# Patient Record
Sex: FEMALE | Race: BLACK OR AFRICAN AMERICAN | URBAN - METROPOLITAN AREA
[De-identification: names, ages, dates, MRNs, and addresses within clinical notes are randomized per-mention and may not be internally consistent; named-entity substitution may affect disease eponyms.]

---
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
## 2022-08-10 ENCOUNTER — OFFICE VISIT (OUTPATIENT)
Dept: PRIMARY CARE CLINIC | Age: 22
End: 2022-08-10
Payer: MEDICARE

## 2022-08-10 VITALS
DIASTOLIC BLOOD PRESSURE: 76 MMHG | WEIGHT: 99.5 LBS | HEART RATE: 91 BPM | OXYGEN SATURATION: 99 % | SYSTOLIC BLOOD PRESSURE: 110 MMHG | HEIGHT: 65 IN | BODY MASS INDEX: 16.58 KG/M2

## 2022-08-10 DIAGNOSIS — Z11.3 ROUTINE SCREENING FOR STI (SEXUALLY TRANSMITTED INFECTION): ICD-10-CM

## 2022-08-10 DIAGNOSIS — Z01.419 ENCOUNTER FOR ROUTINE GYNECOLOGICAL EXAMINATION WITH PAPANICOLAOU SMEAR OF CERVIX: ICD-10-CM

## 2022-08-10 DIAGNOSIS — Z12.4 PAP SMEAR FOR CERVICAL CANCER SCREENING: Primary | ICD-10-CM

## 2022-08-10 DIAGNOSIS — A56.8 CHLAMYDIA TRACHOMATIS INFECTION: ICD-10-CM

## 2022-08-10 DIAGNOSIS — Z30.09 ENCOUNTER FOR CONTRACEPTIVE PLANNING: ICD-10-CM

## 2022-08-10 PROCEDURE — 99395 PREV VISIT EST AGE 18-39: CPT | Performed by: NURSE PRACTITIONER

## 2022-08-10 ASSESSMENT — ANXIETY QUESTIONNAIRES
2. NOT BEING ABLE TO STOP OR CONTROL WORRYING: 0
IF YOU CHECKED OFF ANY PROBLEMS ON THIS QUESTIONNAIRE, HOW DIFFICULT HAVE THESE PROBLEMS MADE IT FOR YOU TO DO YOUR WORK, TAKE CARE OF THINGS AT HOME, OR GET ALONG WITH OTHER PEOPLE: NOT DIFFICULT AT ALL
4. TROUBLE RELAXING: 0
1. FEELING NERVOUS, ANXIOUS, OR ON EDGE: 0
5. BEING SO RESTLESS THAT IT IS HARD TO SIT STILL: 0
6. BECOMING EASILY ANNOYED OR IRRITABLE: 0
GAD7 TOTAL SCORE: 0
3. WORRYING TOO MUCH ABOUT DIFFERENT THINGS: 0
7. FEELING AFRAID AS IF SOMETHING AWFUL MIGHT HAPPEN: 0

## 2022-08-10 ASSESSMENT — ENCOUNTER SYMPTOMS
SHORTNESS OF BREATH: 0
CHEST TIGHTNESS: 0

## 2022-08-10 ASSESSMENT — PATIENT HEALTH QUESTIONNAIRE - PHQ9
SUM OF ALL RESPONSES TO PHQ9 QUESTIONS 1 & 2: 0
1. LITTLE INTEREST OR PLEASURE IN DOING THINGS: 0
SUM OF ALL RESPONSES TO PHQ QUESTIONS 1-9: 0
SUM OF ALL RESPONSES TO PHQ QUESTIONS 1-9: 0
2. FEELING DOWN, DEPRESSED OR HOPELESS: 0
SUM OF ALL RESPONSES TO PHQ QUESTIONS 1-9: 0
SUM OF ALL RESPONSES TO PHQ QUESTIONS 1-9: 0

## 2022-08-10 NOTE — PROGRESS NOTES
Markell Live  YOB: 2000       Chief Complaint:   Markell Live is a 25 y.o. female who presents for routine gynecologic exam/PAP. HPI  Patient reports menstrual period is regularly, occurring every 28 days or so. She denies change in menstrual cycle, dysmenorrhea, dyspareunia, genital discharge, genital ulcers, irregular/heavy menses, pelvic pain, and vulvar/vaginal symptoms. Sexual activity: single partner, contraception - condoms. We discussed pros and cons to both oral birth control and IUD. Patient admits concern for taking oral birth control daily and interested in IUD today. IUD information provided today. Patient requests referral to OB/GYN Dr. David Asencio, located in [place].     - Last PAP: Never  - LMP:  due any day- Patient to start tracking with phone fernando  - Self-breast exams: no- Information/instruction provided. - Exercise: no regular exercise, but stays active. Plans to tryout for dance team at [employer] this fall. 8/10/2022  No Known Allergies  No current outpatient medications on file. No current facility-administered medications for this visit.      Vitals:    08/10/22 0958   BP: 110/76   Site: Left Upper Arm   Position: Sitting   Cuff Size: Small Adult   Pulse: 91   SpO2: 99%   Weight: 99 lb 8 oz (45.1 kg)   Height: 5' 4.8\" (1.646 m)     [unfilled]  BP Readings from Last 3 Encounters:   08/10/22 110/76   09/19/19 108/70 (42 %, Z = -0.20 /  69 %, Z = 0.50)*   10/01/18 91/64 (3 %, Z = -1.88 /  44 %, Z = -0.15)*     *BP percentiles are based on the 2017 AAP Clinical Practice Guideline for girls         Immunization History   Administered Date(s) Administered    DTaP (Infanrix) 01/13/2003, 03/13/2003, 06/16/2003, 08/03/2004, 11/14/2006, 07/30/2008    DTaP/Hep B/IPV (Pediarix) 08/03/2004, 11/14/2006    HIB PRP-T (ActHIB, Hiberix) 08/17/2004    HPV Quadrivalent (Gardasil) 12/01/2014, 07/27/2015    Hepatitis A Ped/Adol (Havrix, Vaqta) 07/30/2008, 08/13/2009 Hepatitis B Ped/Adol (Engerix-B, Recombivax HB) 2000, 2000, 06/16/2003    Hib PRP-OMP (PedvaxHIB) 01/27/2003, 03/20/2003, 06/16/2003    MMR 02/09/2004, 07/30/2008    Meningococcal MCV4P (Menactra) 12/01/2014, 09/19/2019    Pneumococcal Conjugate 7-valent (Celi Camara) 01/27/2003, 03/20/2003, 04/11/2005    Polio IPV (IPOL) 01/13/2003, 03/13/2003, 08/03/2004, 11/14/2006, 07/30/2008    Tdap (Boostrix, Adacel) 12/01/2014    Varicella (Varivax) 07/30/2008, 08/13/2009         There is no problem list on file for this patient. No past medical history on file. No past surgical history on file. No family history on file. Social History     Socioeconomic History    Marital status: Single     Spouse name: Not on file    Number of children: Not on file    Years of education: Not on file    Highest education level: Not on file   Occupational History    Not on file   Tobacco Use    Smoking status: Never    Smokeless tobacco: Never   Substance and Sexual Activity    Alcohol use: No    Drug use: No    Sexual activity: Never   Other Topics Concern    Not on file   Social History Narrative    Not on file     Social Determinants of Health     Financial Resource Strain: Not on file   Food Insecurity: Not on file   Transportation Needs: Not on file   Physical Activity: Not on file   Stress: Not on file   Social Connections: Not on file   Intimate Partner Violence: Not on file   Housing Stability: Not on file     Review of Systems   Constitutional:  Negative for diaphoresis and fatigue. Respiratory:  Negative for chest tightness and shortness of breath. Cardiovascular:  Negative for chest pain, palpitations and leg swelling. Genitourinary:  Negative for decreased urine volume, difficulty urinating, dyspareunia, dysuria, flank pain, frequency, genital sores, hematuria, menstrual problem, pelvic pain, urgency, vaginal bleeding, vaginal discharge and vaginal pain. Musculoskeletal: Negative. Skin: Negative. Neurological:  Negative for dizziness, light-headedness and headaches. Physical Exam   Constitutional: She is oriented to person, place, and time. She appears well-developed and well-nourished. No distress. Neck: No thyromegaly or thyroid nodules noted. Cardiovascular: Normal rate, regular rhythm, normal heart sounds and intact distal pulses. Exam reveals no gallop and no friction rub. No murmur heard. Pulmonary/Chest: Effort normal and breath sounds normal. No respiratory distress. She has no wheezes, rhonchi or rales. Abdominal: Soft, non-tender. Normal bowel sounds and aorta. She exhibits no organomegaly or mass. Genitourinary: Vagina normal and uterus normal. There are no labial lesions. Cervix exhibits no motion tenderness, no discharge and no friability. There is no adnexal mass, tenderness or fullness. There is no inguinal adenopathy. Rectum normal, guiac negative. No hemorrhoids or anal fissures noted. No breast tenderness, skin changes, nipple discharge or dominant mass bilaterally. No axillary adenopathy. Neurological: She is alert and oriented to person, place, and time. Skin: Skin is warm and dry. There is no rash or erythema. No suspicious lesions noted. Psychiatric: She has a normal mood and affect. Her speech is normal and behavior is normal. Judgment, cognition and memory are normal.     Assessment/Plan:    1. Encounter for routine gynecological examination with Papanicolaou smear of cervix  - Healthy 25year old physical exam    2. Pap smear for cervical cancer screening  - PAP SMEAR    3. Routine screening for STI (sexually transmitted infection  - C.trachomatis N.gonorrhoeae DNA, Thin Prep    4. Encounter for contraceptive planning  - Uses condoms for contraception.   - discussed oral birth control pills and IUD- Patient interested in IUD- information provided.    - Ambulatory referral to Obstetrics / Gynecology (Dr. Leslee Glaser)    Return in about 1 year (around 8/10/2023) for annual PAP exam.    Electronically signed by DEVORAH Gage CNP on 8/10/2022 at 10:57 AM     This dictation was generated by voice recognition computer software. Although all attempts are made to edit the dictation for accuracy, there may be errors in the transcription that are not intended.

## 2022-08-12 LAB
C. TRACHOMATIS DNA,THIN PREP: POSITIVE
N. GONORRHOEAE DNA, THIN PREP: NEGATIVE

## 2022-08-14 RX ORDER — DOXYCYCLINE HYCLATE 100 MG/1
100 CAPSULE ORAL 2 TIMES DAILY
Qty: 14 CAPSULE | Refills: 0 | Status: SHIPPED | OUTPATIENT
Start: 2022-08-14 | End: 2022-08-15 | Stop reason: DRUGHIGH

## 2022-08-15 ENCOUNTER — TELEPHONE (OUTPATIENT)
Dept: PRIMARY CARE CLINIC | Age: 22
End: 2022-08-15

## 2022-08-15 DIAGNOSIS — A56.8 CHLAMYDIA TRACHOMATIS INFECTION: ICD-10-CM

## 2022-08-15 RX ORDER — DOXYCYCLINE HYCLATE 100 MG/1
100 CAPSULE ORAL 2 TIMES DAILY
Qty: 14 CAPSULE | Refills: 0 | Status: SHIPPED | OUTPATIENT
Start: 2022-08-15 | End: 2022-08-22

## 2022-08-15 NOTE — PROGRESS NOTES
1. Chlamydia trachomatis infection  - Active  - doxycycline hyclate (VIBRAMYCIN) 100 MG capsule; Take 1 capsule by mouth in the morning and 1 capsule before bedtime. Do all this for 7 days. Take 1 tablet daily x 1 month, than PRN flair ups. .  Dispense: 14 capsule;  Refill: 0     Component Ref Range & Units 8/10/22 1057    C. trachomatis DNA,Thin Prep Negative POSITIVE Abnormal     N. gonorrhoeae DNA, Thin Prep Negative Negative

## 2022-08-15 NOTE — TELEPHONE ENCOUNTER
Patient verified full name and date of birth prior to our discuss/review of lab results. The available lab results below were reviewed, analyzed with independent interpretation and explained to patient at length. Component Ref Range & Units 8/10/22 1057    C. trachomatis DNA,Thin Prep Negative POSITIVE Abnormal     N. gonorrhoeae DNA, Thin Prep Negative Negative      PAP 8/10/22  Negative for Intraepithelial Lesion or Malignancy     SPECIMEN ADEQUACY:   Satisfactory for Evaluation. Endocervical cells/transformation zone   component present. 1. Chlamydia trachomatis infection  - Active  - Start doxycycline hyclate (VIBRAMYCIN) 100 MG capsule; Take 1 capsule by mouth in the morning and 1 capsule before bedtime. Do all this for 7 days. Dispense: 14 capsule; Refill: 0     Plans to make appointment with OB/GYN for IUD- has fall break in Oct.   Declines to start oral contraceptives at this time. Reminded IUD prevents pregnancy but will still need to use condoms to prevent STIs.

## 2022-11-29 ENCOUNTER — TELEPHONE (OUTPATIENT)
Dept: PRIMARY CARE CLINIC | Age: 22
End: 2022-11-29

## 2022-11-29 NOTE — TELEPHONE ENCOUNTER
Received a blank disability verification form from [facility] for the patient. Scanned into media and placed in [place]'s basket.

## 2022-12-01 NOTE — TELEPHONE ENCOUNTER
Please notify patient I do not perform disability evaluations for students to receive learning accommodations. Form returned to your desk to mail to patient. She needs to schedule testing either close to my office in [place] or ask the Disability Education Office at her school for a list of resources close to [facility].     [facility]   [facility] and [address] [address] Radha Izaguirre  Phone: [phone]  Fax: [phone]

## 2022-12-01 NOTE — TELEPHONE ENCOUNTER
Pt notified that Antonella Quan the provider do not perform disability evaluations for students to receive learning accommodations that she needs to schedule testing either close to my office in [place] or ask the Disability Education Office at her school for a list of resources close to [facility] for [facility] provider to the pt. [facility] and [address] [place] [address] Radha Izaguirre  Phone: [phone]      She expressed understanding.

## 2024-08-19 ENCOUNTER — OFFICE VISIT (OUTPATIENT)
Dept: INTERNAL MEDICINE CLINIC | Age: 24
End: 2024-08-19
Payer: MEDICAID

## 2024-08-19 VITALS
WEIGHT: 109 LBS | BODY MASS INDEX: 17.52 KG/M2 | SYSTOLIC BLOOD PRESSURE: 96 MMHG | HEIGHT: 66 IN | DIASTOLIC BLOOD PRESSURE: 68 MMHG

## 2024-08-19 DIAGNOSIS — R68.89 COLD INTOLERANCE: ICD-10-CM

## 2024-08-19 DIAGNOSIS — F50.89 PAGOPHAGIA: ICD-10-CM

## 2024-08-19 DIAGNOSIS — Z11.4 SCREENING FOR HIV WITHOUT PRESENCE OF RISK FACTORS: ICD-10-CM

## 2024-08-19 DIAGNOSIS — Z00.00 WELL ADULT EXAM: ICD-10-CM

## 2024-08-19 DIAGNOSIS — Z11.3 SCREENING EXAMINATION FOR STI: ICD-10-CM

## 2024-08-19 DIAGNOSIS — R63.6 UNDERWEIGHT: ICD-10-CM

## 2024-08-19 DIAGNOSIS — Z30.02 ENCOUNTER FOR COUNSELING AND INSTRUCTION IN NATURAL FAMILY PLANNING TO AVOID PREGNANCY: ICD-10-CM

## 2024-08-19 DIAGNOSIS — Z11.59 NEED FOR HEPATITIS C SCREENING TEST: ICD-10-CM

## 2024-08-19 DIAGNOSIS — Z00.00 WELL ADULT EXAM: Primary | ICD-10-CM

## 2024-08-19 PROBLEM — G43.909 MIGRAINE WITHOUT STATUS MIGRAINOSUS, NOT INTRACTABLE: Status: ACTIVE | Noted: 2024-08-19

## 2024-08-19 PROBLEM — F41.9 ANXIETY: Status: ACTIVE | Noted: 2024-08-19

## 2024-08-19 PROCEDURE — 99385 PREV VISIT NEW AGE 18-39: CPT | Performed by: STUDENT IN AN ORGANIZED HEALTH CARE EDUCATION/TRAINING PROGRAM

## 2024-08-19 PROCEDURE — 99204 OFFICE O/P NEW MOD 45 MIN: CPT | Performed by: STUDENT IN AN ORGANIZED HEALTH CARE EDUCATION/TRAINING PROGRAM

## 2024-08-19 RX ORDER — SUMATRIPTAN 25 MG/1
TABLET, FILM COATED ORAL
COMMUNITY

## 2024-08-19 SDOH — ECONOMIC STABILITY: INCOME INSECURITY: HOW HARD IS IT FOR YOU TO PAY FOR THE VERY BASICS LIKE FOOD, HOUSING, MEDICAL CARE, AND HEATING?: NOT HARD AT ALL

## 2024-08-19 SDOH — ECONOMIC STABILITY: FOOD INSECURITY: WITHIN THE PAST 12 MONTHS, THE FOOD YOU BOUGHT JUST DIDN'T LAST AND YOU DIDN'T HAVE MONEY TO GET MORE.: NEVER TRUE

## 2024-08-19 SDOH — ECONOMIC STABILITY: FOOD INSECURITY: WITHIN THE PAST 12 MONTHS, YOU WORRIED THAT YOUR FOOD WOULD RUN OUT BEFORE YOU GOT MONEY TO BUY MORE.: NEVER TRUE

## 2024-08-19 ASSESSMENT — PATIENT HEALTH QUESTIONNAIRE - PHQ9
7. TROUBLE CONCENTRATING ON THINGS, SUCH AS READING THE NEWSPAPER OR WATCHING TELEVISION: MORE THAN HALF THE DAYS
SUM OF ALL RESPONSES TO PHQ9 QUESTIONS 1 & 2: 4
2. FEELING DOWN, DEPRESSED OR HOPELESS: MORE THAN HALF THE DAYS
SUM OF ALL RESPONSES TO PHQ QUESTIONS 1-9: 11
4. FEELING TIRED OR HAVING LITTLE ENERGY: SEVERAL DAYS
6. FEELING BAD ABOUT YOURSELF - OR THAT YOU ARE A FAILURE OR HAVE LET YOURSELF OR YOUR FAMILY DOWN: SEVERAL DAYS
10. IF YOU CHECKED OFF ANY PROBLEMS, HOW DIFFICULT HAVE THESE PROBLEMS MADE IT FOR YOU TO DO YOUR WORK, TAKE CARE OF THINGS AT HOME, OR GET ALONG WITH OTHER PEOPLE: SOMEWHAT DIFFICULT
SUM OF ALL RESPONSES TO PHQ QUESTIONS 1-9: 11
SUM OF ALL RESPONSES TO PHQ QUESTIONS 1-9: 11
1. LITTLE INTEREST OR PLEASURE IN DOING THINGS: MORE THAN HALF THE DAYS
9. THOUGHTS THAT YOU WOULD BE BETTER OFF DEAD, OR OF HURTING YOURSELF: NOT AT ALL
8. MOVING OR SPEAKING SO SLOWLY THAT OTHER PEOPLE COULD HAVE NOTICED. OR THE OPPOSITE, BEING SO FIGETY OR RESTLESS THAT YOU HAVE BEEN MOVING AROUND A LOT MORE THAN USUAL: SEVERAL DAYS
5. POOR APPETITE OR OVEREATING: SEVERAL DAYS
SUM OF ALL RESPONSES TO PHQ QUESTIONS 1-9: 11
3. TROUBLE FALLING OR STAYING ASLEEP: SEVERAL DAYS

## 2024-08-19 ASSESSMENT — ANXIETY QUESTIONNAIRES
3. WORRYING TOO MUCH ABOUT DIFFERENT THINGS: MORE THAN HALF THE DAYS
6. BECOMING EASILY ANNOYED OR IRRITABLE: NEARLY EVERY DAY
4. TROUBLE RELAXING: MORE THAN HALF THE DAYS
7. FEELING AFRAID AS IF SOMETHING AWFUL MIGHT HAPPEN: SEVERAL DAYS
IF YOU CHECKED OFF ANY PROBLEMS ON THIS QUESTIONNAIRE, HOW DIFFICULT HAVE THESE PROBLEMS MADE IT FOR YOU TO DO YOUR WORK, TAKE CARE OF THINGS AT HOME, OR GET ALONG WITH OTHER PEOPLE: SOMEWHAT DIFFICULT
1. FEELING NERVOUS, ANXIOUS, OR ON EDGE: NEARLY EVERY DAY
5. BEING SO RESTLESS THAT IT IS HARD TO SIT STILL: SEVERAL DAYS
GAD7 TOTAL SCORE: 15
2. NOT BEING ABLE TO STOP OR CONTROL WORRYING: NEARLY EVERY DAY

## 2024-08-19 ASSESSMENT — ENCOUNTER SYMPTOMS
BLOOD IN STOOL: 0
COUGH: 0
ABDOMINAL PAIN: 0
WHEEZING: 0
SHORTNESS OF BREATH: 0

## 2024-08-19 NOTE — PATIENT INSTRUCTIONS
Thank you for allowing me to care for you!    Patient instructions:  Fasting labs          Foods low in mercury, but high in omega 3 fats  S.M.A.S.H  S: Yari  M: Mackeredonnell (Not Yannick)  A: Yair   S: George  H: Herring     Unrefined Grains  -Brown rice  - Farro  -Quinoa  -Whole-grain bread  -Barley  -Bran cereal  - Steel cut oatmeal  - Bulgar wheat      Benefits of food sequencing  Eating fiber first, then protein and fat, and finishing with carbohydrates could be beneficial.  This order could slow the speed that your stomach empties and help lower your blood sugar levels. It's crucial to pay attention to fiber.    Food preparation  Choose to steam, bake, grill, poach, braise or boil your foods, rather than deep rios them. Use non-stick cookware    Physical Activity Recommendation  JUST MOVE!    Is Sitting The New Smoking?        What are you doing as you read this?  Having a cup of coffee?  Taking a break from work?  Getting ready for bed?  Whatever you’re up to, chances are you’re sitting down.  We get it, your feet are tired!  That chair is comfortable.  What are you supposed to do--standon the subway?    Most of us have heard the phrase “sitting is the new smoking” referring to the growing epidemic of sedentary lifestyles in the [place].  But is this true?  Is sitting in a chair that bad for you?  We decided to find out.    THE FACTS  - Over 25% of American adults sit for more than 8 hours every day. 44% of those people get little to no exercise.  - The average American watches approximately 3 hours of television every day.  - The average American is active less than 20 minutes every day.  - 60-75 minutes of moderate activity (steady walking) can counter the effects of too much sitting.    A 2011 study documented 800,000 people and their sitting habits.  The study found that people who sit the most, compared to people who sit the least, have a greater risk of disease and death:    - 112% increased risk

## 2024-08-19 NOTE — PROGRESS NOTES
Transportation     Lack of Transportation (Non-Medical): No    Received from [facility]     [employer]   Housing Stability: Unknown (8/19/2024)    Housing Stability Vital Sign     Homeless in the Last Year: No        Patient Active Problem List   Diagnosis    Migraine without status migrainosus, not intractable    Anxiety     Past Medical History:   Diagnosis Date    ADHD (attention deficit hyperactivity disorder)     Anxiety     Cyst of left breast      Current Outpatient Medications   Medication Sig Dispense Refill    SUMAtriptan (IMITREX) 25 MG tablet TAKE 1 TABLET BY MOUTH AS NEEDED FOR MIGRAINE. MAY REPEAT IN 2 HOURS IF NEEDED. MAXIMUM DAILY DOSE IS 8 TABLETS       No current facility-administered medications for this visit.     No Known Allergies  History reviewed. No pertinent surgical history.   Family History   Problem Relation Age of Onset    High Blood Pressure Mother     Prostate Cancer Maternal Grandfather     Lung Cancer Paternal Grandmother         Smoker    Lupus Paternal Grandmother         Review of Systems   Constitutional:  Negative for chills, fatigue and fever.   Respiratory:  Negative for cough, shortness of breath and wheezing.    Cardiovascular:  Negative for chest pain and palpitations.   Gastrointestinal:  Negative for abdominal pain and blood in stool.   Endocrine: Positive for cold intolerance. Negative for polydipsia, polyphagia and polyuria.   Genitourinary:  Negative for dysuria, hematuria and vaginal bleeding.   Musculoskeletal:  Negative for joint swelling.   Skin:  Negative for rash.   Neurological:  Negative for dizziness, syncope and light-headedness.     Vitals:    08/19/24 0830   BP: 96/68   Site: Right Upper Arm   Position: Sitting   Cuff Size: Medium Adult   Weight: 49.4 kg (109 lb)   Height: 1.665 m (5' 5.55\")      Body mass index is 17.83 kg/m².     Wt Readings from Last 3 Encounters:   08/19/24 49.4 kg (109 lb)   08/10/22 45.1 kg (99 lb 8 oz) (3%, Z= -1.86)*

## 2024-08-21 LAB
C TRACH DNA CVX QL NAA+PROBE: NEGATIVE
N GONORRHOEA DNA CERV MUCUS QL NAA+PROBE: NEGATIVE

## 2024-08-22 DIAGNOSIS — R63.6 UNDERWEIGHT: ICD-10-CM

## 2024-08-22 DIAGNOSIS — R68.89 COLD INTOLERANCE: ICD-10-CM

## 2024-08-22 DIAGNOSIS — F50.89 PAGOPHAGIA: ICD-10-CM

## 2024-08-22 DIAGNOSIS — Z00.00 WELL ADULT EXAM: ICD-10-CM

## 2024-08-22 DIAGNOSIS — Z11.59 NEED FOR HEPATITIS C SCREENING TEST: ICD-10-CM

## 2024-08-22 DIAGNOSIS — Z11.4 SCREENING FOR HIV WITHOUT PRESENCE OF RISK FACTORS: ICD-10-CM

## 2024-08-22 LAB
ALBUMIN SERPL-MCNC: 4.3 G/DL (ref 3.4–5)
ALBUMIN/GLOB SERPL: 1.5 {RATIO} (ref 1.1–2.2)
ALP SERPL-CCNC: 72 U/L (ref 40–129)
ALT SERPL-CCNC: 13 U/L (ref 10–40)
ANION GAP SERPL CALCULATED.3IONS-SCNC: 10 MMOL/L (ref 3–16)
AST SERPL-CCNC: 22 U/L (ref 15–37)
BASOPHILS # BLD: 0.1 K/UL (ref 0–0.2)
BASOPHILS NFR BLD: 0.7 %
BILIRUB SERPL-MCNC: <0.2 MG/DL (ref 0–1)
BUN SERPL-MCNC: 9 MG/DL (ref 7–20)
CALCIUM SERPL-MCNC: 9.2 MG/DL (ref 8.3–10.6)
CHLORIDE SERPL-SCNC: 102 MMOL/L (ref 99–110)
CHOLEST SERPL-MCNC: 179 MG/DL (ref 0–199)
CO2 SERPL-SCNC: 24 MMOL/L (ref 21–32)
CREAT SERPL-MCNC: 0.7 MG/DL (ref 0.6–1.1)
DEPRECATED RDW RBC AUTO: 16.6 % (ref 12.4–15.4)
EOSINOPHIL # BLD: 0 K/UL (ref 0–0.6)
EOSINOPHIL NFR BLD: 0.5 %
GFR SERPLBLD CREATININE-BSD FMLA CKD-EPI: >90 ML/MIN/{1.73_M2}
GLUCOSE SERPL-MCNC: 75 MG/DL (ref 70–99)
HCT VFR BLD AUTO: 37.3 % (ref 36–48)
HDLC SERPL-MCNC: 88 MG/DL (ref 40–60)
HGB BLD-MCNC: 12.3 G/DL (ref 12–16)
LDL CHOLESTEROL: 83 MG/DL
LYMPHOCYTES # BLD: 2.5 K/UL (ref 1–5.1)
LYMPHOCYTES NFR BLD: 30.3 %
MCH RBC QN AUTO: 28 PG (ref 26–34)
MCHC RBC AUTO-ENTMCNC: 32.9 G/DL (ref 31–36)
MCV RBC AUTO: 85 FL (ref 80–100)
MONOCYTES # BLD: 0.4 K/UL (ref 0–1.3)
MONOCYTES NFR BLD: 5.5 %
NEUTROPHILS # BLD: 5.2 K/UL (ref 1.7–7.7)
NEUTROPHILS NFR BLD: 63 %
PLATELET # BLD AUTO: 253 K/UL (ref 135–450)
PMV BLD AUTO: 9.3 FL (ref 5–10.5)
POTASSIUM SERPL-SCNC: 4.4 MMOL/L (ref 3.5–5.1)
PROT SERPL-MCNC: 7.1 G/DL (ref 6.4–8.2)
RBC # BLD AUTO: 4.38 M/UL (ref 4–5.2)
SODIUM SERPL-SCNC: 136 MMOL/L (ref 136–145)
TRIGL SERPL-MCNC: 38 MG/DL (ref 0–150)
TSH SERPL DL<=0.005 MIU/L-ACNC: 1.08 UIU/ML (ref 0.27–4.2)
VLDLC SERPL CALC-MCNC: 8 MG/DL
WBC # BLD AUTO: 8.2 K/UL (ref 4–11)

## 2024-08-23 LAB
HCV AB SERPL QL IA: NORMAL
HIV 1+2 AB+HIV1 P24 AG SERPL QL IA: NORMAL
HIV 2 AB SERPL QL IA: NORMAL
HIV1 AB SERPL QL IA: NORMAL
HIV1 P24 AG SERPL QL IA: NORMAL

## 2024-09-03 ENCOUNTER — TELEMEDICINE (OUTPATIENT)
Dept: INTERNAL MEDICINE CLINIC | Age: 24
End: 2024-09-03
Payer: MEDICAID

## 2024-09-03 DIAGNOSIS — F41.8 TEST ANXIETY: ICD-10-CM

## 2024-09-03 DIAGNOSIS — R63.6 UNDERWEIGHT: Primary | ICD-10-CM

## 2024-09-03 DIAGNOSIS — F33.41 RECURRENT MAJOR DEPRESSIVE DISORDER, IN PARTIAL REMISSION (HCC): ICD-10-CM

## 2024-09-03 DIAGNOSIS — F41.1 GAD (GENERALIZED ANXIETY DISORDER): ICD-10-CM

## 2024-09-03 PROCEDURE — 99214 OFFICE O/P EST MOD 30 MIN: CPT | Performed by: STUDENT IN AN ORGANIZED HEALTH CARE EDUCATION/TRAINING PROGRAM

## 2024-09-03 ASSESSMENT — PATIENT HEALTH QUESTIONNAIRE - PHQ9
SUM OF ALL RESPONSES TO PHQ9 QUESTIONS 1 & 2: 1
SUM OF ALL RESPONSES TO PHQ QUESTIONS 1-9: 3
SUM OF ALL RESPONSES TO PHQ QUESTIONS 1-9: 3
4. FEELING TIRED OR HAVING LITTLE ENERGY: SEVERAL DAYS
2. FEELING DOWN, DEPRESSED OR HOPELESS: NOT AT ALL
9. THOUGHTS THAT YOU WOULD BE BETTER OFF DEAD, OR OF HURTING YOURSELF: NOT AT ALL
5. POOR APPETITE OR OVEREATING: NOT AT ALL
6. FEELING BAD ABOUT YOURSELF - OR THAT YOU ARE A FAILURE OR HAVE LET YOURSELF OR YOUR FAMILY DOWN: NOT AT ALL
SUM OF ALL RESPONSES TO PHQ QUESTIONS 1-9: 3
SUM OF ALL RESPONSES TO PHQ QUESTIONS 1-9: 3
7. TROUBLE CONCENTRATING ON THINGS, SUCH AS READING THE NEWSPAPER OR WATCHING TELEVISION: SEVERAL DAYS
8. MOVING OR SPEAKING SO SLOWLY THAT OTHER PEOPLE COULD HAVE NOTICED. OR THE OPPOSITE, BEING SO FIGETY OR RESTLESS THAT YOU HAVE BEEN MOVING AROUND A LOT MORE THAN USUAL: NOT AT ALL
3. TROUBLE FALLING OR STAYING ASLEEP: NOT AT ALL
1. LITTLE INTEREST OR PLEASURE IN DOING THINGS: SEVERAL DAYS
10. IF YOU CHECKED OFF ANY PROBLEMS, HOW DIFFICULT HAVE THESE PROBLEMS MADE IT FOR YOU TO DO YOUR WORK, TAKE CARE OF THINGS AT HOME, OR GET ALONG WITH OTHER PEOPLE: NOT DIFFICULT AT ALL

## 2024-09-03 ASSESSMENT — ANXIETY QUESTIONNAIRES
3. WORRYING TOO MUCH ABOUT DIFFERENT THINGS: MORE THAN HALF THE DAYS
7. FEELING AFRAID AS IF SOMETHING AWFUL MIGHT HAPPEN: MORE THAN HALF THE DAYS
4. TROUBLE RELAXING: MORE THAN HALF THE DAYS
GAD7 TOTAL SCORE: 14
1. FEELING NERVOUS, ANXIOUS, OR ON EDGE: MORE THAN HALF THE DAYS
2. NOT BEING ABLE TO STOP OR CONTROL WORRYING: MORE THAN HALF THE DAYS
6. BECOMING EASILY ANNOYED OR IRRITABLE: MORE THAN HALF THE DAYS
5. BEING SO RESTLESS THAT IT IS HARD TO SIT STILL: MORE THAN HALF THE DAYS
IF YOU CHECKED OFF ANY PROBLEMS ON THIS QUESTIONNAIRE, HOW DIFFICULT HAVE THESE PROBLEMS MADE IT FOR YOU TO DO YOUR WORK, TAKE CARE OF THINGS AT HOME, OR GET ALONG WITH OTHER PEOPLE: SOMEWHAT DIFFICULT

## 2024-09-03 NOTE — PROGRESS NOTES
distress        [] Abnormal -      Musculoskeletal:   [x] Normal gait with no signs of ataxia         [x] Normal range of motion of neck        [] Abnormal -     Neurological:        [x] No Facial Asymmetry (Cranial nerve 7 motor function) (limited exam due to video visit)          [x] No gaze palsy        [] Abnormal -          Skin:        [x] No significant exanthematous lesions or discoloration noted on facial skin         [] Abnormal -            Psychiatric:       [x] Normal Affect [] Abnormal -        [x] No Hallucinations    Other pertinent observable physical exam findings:-        --Lo Chen, DO

## 2024-12-15 ENCOUNTER — OFFICE VISIT (OUTPATIENT)
Age: 24
End: 2024-12-15

## 2024-12-15 VITALS
TEMPERATURE: 98.9 F | SYSTOLIC BLOOD PRESSURE: 96 MMHG | OXYGEN SATURATION: 95 % | HEART RATE: 83 BPM | BODY MASS INDEX: 16.49 KG/M2 | DIASTOLIC BLOOD PRESSURE: 68 MMHG | WEIGHT: 100.8 LBS

## 2024-12-15 DIAGNOSIS — J02.9 VIRAL PHARYNGITIS: Primary | ICD-10-CM

## 2024-12-15 DIAGNOSIS — J02.9 SORE THROAT: ICD-10-CM

## 2024-12-15 LAB — S PYO AG THROAT QL: NORMAL

## 2024-12-15 RX ORDER — CETIRIZINE HYDROCHLORIDE 10 MG/1
10 TABLET ORAL DAILY
Qty: 7 TABLET | Refills: 0 | Status: SHIPPED | OUTPATIENT
Start: 2024-12-15 | End: 2024-12-22

## 2024-12-15 RX ORDER — METHYLPREDNISOLONE 4 MG/1
TABLET ORAL
Qty: 1 KIT | Refills: 0 | Status: SHIPPED | OUTPATIENT
Start: 2024-12-15